# Patient Record
Sex: MALE | Race: OTHER | NOT HISPANIC OR LATINO | URBAN - METROPOLITAN AREA
[De-identification: names, ages, dates, MRNs, and addresses within clinical notes are randomized per-mention and may not be internally consistent; named-entity substitution may affect disease eponyms.]

---

## 2017-11-16 ENCOUNTER — EMERGENCY (EMERGENCY)
Facility: HOSPITAL | Age: 32
LOS: 1 days | Discharge: HOME | End: 2017-11-16
Attending: EMERGENCY MEDICINE | Admitting: EMERGENCY MEDICINE
Payer: COMMERCIAL

## 2017-11-16 VITALS
TEMPERATURE: 97 F | DIASTOLIC BLOOD PRESSURE: 74 MMHG | SYSTOLIC BLOOD PRESSURE: 112 MMHG | RESPIRATION RATE: 15 BRPM | HEART RATE: 76 BPM | OXYGEN SATURATION: 98 % | WEIGHT: 138.01 LBS
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
## 2017-11-16 DIAGNOSIS — S81.851A OPEN BITE, RIGHT LOWER LEG, INITIAL ENCOUNTER: ICD-10-CM
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
## 2017-11-16 PROCEDURE — 99282 EMERGENCY DEPT VISIT SF MDM: CPT

## 2017-11-16 NOTE — ED PROVIDER NOTE - SKIN, MLM
Pants with tear but underlying skin to torn pants is completely normal showing no signs of acute injury

## 2017-11-16 NOTE — ED ADULT NURSE NOTE - PMH
Controlled Substance Refill Request for traMADol (ULTRAM) 50 MG tablet    Problem List Complete:  No     PROVIDER TO CONSIDER COMPLETION OF PROBLEM LIST AND OVERVIEW/CONTROLLED SUBSTANCE AGREEMENT    Last Written Prescription Date:  12/09/16  Last Fill Quantity: 20,   # refills: 0    Last Office Visit with [facility] primary care provider: Aisha Kent, 01/11/17    Future Office visit:     Controlled substance agreement on file: No.     Processing:  Patient will  in clinic   checked in past 6 months?  No, route to RN    
No pertinent past medical history

## 2017-11-16 NOTE — ED PROVIDER NOTE - OBJECTIVE STATEMENT
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 No pain and no bleeding.

## 2017-11-16 NOTE — ED ADULT NURSE NOTE - OBJECTIVE STATEMENT
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

## 2018-12-18 ENCOUNTER — EMERGENCY (EMERGENCY)
Facility: HOSPITAL | Age: 33
LOS: 1 days | Discharge: HOME | End: 2018-12-18
Attending: EMERGENCY MEDICINE | Admitting: EMERGENCY MEDICINE
Payer: MEDICAID

## 2018-12-18 VITALS
SYSTOLIC BLOOD PRESSURE: 110 MMHG | DIASTOLIC BLOOD PRESSURE: 74 MMHG | RESPIRATION RATE: 14 BRPM | TEMPERATURE: 98 F | HEART RATE: 80 BPM | OXYGEN SATURATION: 99 %

## 2018-12-18 VITALS
DIASTOLIC BLOOD PRESSURE: 90 MMHG | OXYGEN SATURATION: 98 % | SYSTOLIC BLOOD PRESSURE: 130 MMHG | WEIGHT: 134.04 LBS | HEART RATE: 69 BPM | HEIGHT: 66 IN | TEMPERATURE: 97 F | RESPIRATION RATE: 16 BRPM

## 2018-12-18 PROCEDURE — 99283 EMERGENCY DEPT VISIT LOW MDM: CPT

## 2018-12-18 NOTE — ED PROVIDER NOTE - EYE EXAM METHOD
fluorescein/no obvious abrasion/fluorescent uptake. EOM intact, PERRLA, (+) left eye injection and lacrimation, [place] noted pressure 14 left eye and 17 right eye.

## 2018-12-18 NOTE — ED PROVIDER NOTE - ATTENDING CONTRIBUTION TO CARE
I have personally performed a face to face diagnostic evaluation on this patient.  I have reviewed the PA note and agree with the history, exam, and plan of care, except as noted.  History and Exam by me shows left eye photophobia, conjunctiva injected, pupils PERRL, EOMI, intraocular pressure 14 left, 18 right, vision 20/50 left, 20/30 right, spoke with optho transfer center Dr. Adams, case discussed, AR home, to be seen in clinic tomorrow 8:30am.

## 2018-12-18 NOTE — ED PROVIDER NOTE - MEDICAL DECISION MAKING DETAILS
34 y/o male with no significant PMhx presents today due to eye redness x 1 day. pt admits to pain, tearing, and minor photophobia. pt notes minor headache to left side of head.  Exam noted left eye injection and lacrimation, pressue in left eye 14, OS 20/50 OD 20/30

## 2018-12-18 NOTE — ED PROVIDER NOTE - CRANIAL NERVE AND PUPILLARY EXAM
cranial nerves 2-12 intact/extra-ocular movements intact/central vision intact/peripheral vision intact/tongue is midline

## 2018-12-18 NOTE — ED PROVIDER NOTE - OBJECTIVE STATEMENT
34 y/o male with no significant PMhx presents today due to eye redness x 1 day. pt admits to pain, tearing, and minor photophobia. pt notes minor headache to left side of head. pt denies trauma, fever, chills, contact lens use, foreign body, chemical exposure, loss of vision, N/V, head injury, numbness/weakness, neck stiffness, radiculopathies, or any other complaints.

## 2018-12-18 NOTE — ED ADULT NURSE NOTE - OBJECTIVE STATEMENT
Pt is 33y M, came to ED with c/o eye redness and irritation, erythematous conjunctiva, photophobia, pain, to be evaluated for possible abrasion, no discharge from the eye, advised on plan of care, verbalized understanding, awaiting dispo

## 2018-12-18 NOTE — ED PROVIDER NOTE - PROGRESS NOTE DETAILS
Discussed with transfer center, ophthalmology paged and waiting for call back. spoke with aydee Adams spoke with aydee Adams transfer center, case discussed, no emergent intervention needed at this time, to be seen in opt clinic tomorrow [address] [place] [address] at 8:30am, patient agrees, dc home

## 2019-12-14 NOTE — ED ADULT NURSE NOTE - DOES PATIENT HAVE ADVANCE DIRECTIVE
Renal condition is newly identified. Likely combination of hydration and plamapheresis (discontinued after 2 sessions)  Continue current treatment regimen.  Renal condition will be reassessed daily.   No

## 2024-10-21 NOTE — ED ADULT NURSE NOTE - NS_NURSE_DISC_TEACHING_YN_ED_ALL_ED
----- Message from Omaira sent at 10/21/2024  1:00 PM CDT -----  Contact: self  Type: Needs Medical Advice  Who Called:  the patient     Best Call Back Number: [phone]  Additional Information: pt stated he has not set up voicemail..it will beep and then you may leave a message. He is looking for the time for tomorrows procedure so that he can find someone for transportation  
----- Message from Omaira sent at 10/21/2024  8:31 AM CDT -----  Regarding: Needs Medical Advice  Contact: patient at [phone]  Type: Needs Medical Advice    Who Called:  patient at [phone]    Additional Information: patient would like to get details for tomorrow's procedure. Please call and advise. Thank you  
Called pt. Couldn't lvm  
Called pt. Couldn't lvm. It didn't beep, just hung up.   
Yes